# Patient Record
Sex: MALE | Race: BLACK OR AFRICAN AMERICAN | NOT HISPANIC OR LATINO | Employment: OTHER | URBAN - METROPOLITAN AREA
[De-identification: names, ages, dates, MRNs, and addresses within clinical notes are randomized per-mention and may not be internally consistent; named-entity substitution may affect disease eponyms.]

---

## 2019-06-14 PROBLEM — K70.30 ALCOHOLIC CIRRHOSIS: Status: ACTIVE | Noted: 2018-04-03

## 2019-07-12 PROBLEM — K74.60 HEPATIC CIRRHOSIS: Status: ACTIVE | Noted: 2018-04-03

## 2019-07-12 PROBLEM — R74.8 ELEVATED LIVER ENZYMES: Status: ACTIVE | Noted: 2019-07-12

## 2019-07-12 PROBLEM — K62.1 RECTAL POLYP: Status: ACTIVE | Noted: 2019-04-30

## 2019-07-12 PROBLEM — R06.02 SHORTNESS OF BREATH: Status: ACTIVE | Noted: 2018-08-17

## 2019-07-12 PROBLEM — K76.82 ENCEPHALOPATHY, HEPATIC: Status: ACTIVE | Noted: 2019-07-12

## 2019-07-12 PROBLEM — I10 ESSENTIAL HYPERTENSION: Status: ACTIVE | Noted: 2019-07-12

## 2019-07-12 PROBLEM — R60.0 LEG EDEMA: Status: ACTIVE | Noted: 2019-07-12

## 2019-07-12 PROBLEM — C20 RECTAL CANCER: Status: ACTIVE | Noted: 2019-05-14

## 2019-07-23 PROBLEM — H40.003 GLAUCOMA SUSPECT OF BOTH EYES: Status: ACTIVE | Noted: 2019-07-23

## 2019-07-23 PROBLEM — H35.363 DRUSEN (DEGENERATIVE) OF MACULA, BILATERAL: Status: ACTIVE | Noted: 2019-07-23

## 2019-07-23 PROBLEM — H25.813 COMBINED FORM OF AGE-RELATED CATARACT, BOTH EYES: Status: ACTIVE | Noted: 2019-07-23

## 2019-08-02 PROBLEM — Z53.21 PATIENT LEFT WITHOUT BEING SEEN: Status: ACTIVE | Noted: 2019-08-02

## 2019-08-14 PROBLEM — H25.811 COMBINED FORMS OF AGE-RELATED CATARACT OF RIGHT EYE: Status: ACTIVE | Noted: 2019-08-14

## 2019-08-14 PROBLEM — Z98.42 STATUS POST LEFT CATARACT EXTRACTION: Status: ACTIVE | Noted: 2019-08-14

## 2021-05-06 PROBLEM — D64.9 ANEMIA: Status: ACTIVE | Noted: 2021-05-06

## 2021-05-06 PROBLEM — D68.9 COAGULOPATHY: Status: ACTIVE | Noted: 2021-05-06

## 2021-05-06 PROBLEM — E88.09 HYPOALBUMINEMIA: Status: ACTIVE | Noted: 2021-05-06

## 2021-05-06 PROBLEM — D69.6 THROMBOCYTOPENIA: Status: ACTIVE | Noted: 2021-05-06

## 2021-05-06 PROBLEM — N18.9 CHRONIC RENAL FAILURE: Status: ACTIVE | Noted: 2021-05-06

## 2021-05-06 PROBLEM — E87.20 METABOLIC ACIDEMIA: Status: ACTIVE | Noted: 2021-05-06

## 2022-01-12 PROBLEM — E87.20 METABOLIC ACIDEMIA: Status: RESOLVED | Noted: 2021-05-06 | Resolved: 2022-01-12

## 2022-01-12 PROBLEM — E87.5 HYPERKALEMIA: Status: ACTIVE | Noted: 2022-01-12

## 2022-01-12 PROBLEM — N18.32 STAGE 3B CHRONIC KIDNEY DISEASE: Status: ACTIVE | Noted: 2021-05-06

## 2022-01-26 PROBLEM — E87.1 HYPONATREMIA: Status: ACTIVE | Noted: 2022-01-26

## 2022-01-26 PROBLEM — K74.60 HEPATIC CIRRHOSIS: Status: RESOLVED | Noted: 2018-04-03 | Resolved: 2022-01-26

## 2022-01-28 ENCOUNTER — SPECIALTY PHARMACY (OUTPATIENT)
Dept: PHARMACY | Facility: CLINIC | Age: 72
End: 2022-01-28
Payer: MEDICARE

## 2022-01-28 NOTE — TELEPHONE ENCOUNTER
New RX Epclusa received. Test claim shows PA REQUIRED. Submitted via [facility] on 2/1/22. (Key: [id number]) Pending determination.

## 2022-02-02 NOTE — TELEPHONE ENCOUNTER
EPCLUSA PA APPROVED. [id number]. SOFOS/VELDONALD -100 is approved through 04/26/2022. $0 COPAY. FORWARDING TO INITIAL CONSULT/SHIPMENT.

## 2022-02-10 ENCOUNTER — SPECIALTY PHARMACY (OUTPATIENT)
Dept: PHARMACY | Facility: CLINIC | Age: 72
End: 2022-02-10
Payer: MEDICARE

## 2022-02-10 RX ORDER — ACETAMINOPHEN 500 MG
500 TABLET ORAL EVERY 6 HOURS PRN
COMMUNITY
End: 2022-05-04

## 2022-02-10 NOTE — TELEPHONE ENCOUNTER
Specialty Pharmacy - Initial Clinical Assessment  EPCLUSA REFILL 1 OF 3  Specialty Medication Orders Linked to Encounter    Flowsheet Row Most Recent Value   Medication #1 sofosbuvir-velpatasvir 400-100 mg Tab (Order#[id number], Rx#[id number])        Pierre Segura is a 71 y.o. male, who is followed by the specialty pharmacy service for management and education. Today he received education before his first fill with [facility].    Current Outpatient Medications   Medication Sig    acetaminophen (TYLENOL) 500 MG tablet Take 500 mg by mouth every 6 (six) hours as needed.    amLODIPine (NORVASC) 10 MG tablet Take 1 tablet (10 mg total) by mouth once daily.    folic acid (FOLVITE) 1 MG tablet TK 1 T PO QD    furosemide (LASIX) 40 MG tablet Take 1 tablet (40 mg total) by mouth once daily.    lactulose (CONSTULOSE) 10 gram/15 mL solution Take 30 mLs (20 g total) by mouth 3 (three) times daily.    latanoprost (XALATAN) 0.005 % ophthalmic solution Place 1 drop into both eyes every evening.    magnesium oxide (MAG-OX) 400 mg (241.3 mg magnesium) tablet TK 1 T PO QD    pantoprazole (PROTONIX) 40 MG tablet Take 1 tablet (40 mg total) by mouth once daily.    propranoloL (INDERAL) 60 MG tablet Take 1 tablet (60 mg total) by mouth 2 (two) times daily.    rifAXIMin (XIFAXAN) 550 mg Tab Take 1 tablet (550 mg total) by mouth 2 (two) times daily.    sofosbuvir-velpatasvir 400-100 mg Tab Take 1 tablet by mouth once daily.    thiamine (VITAMIN B-1) 100 MG tablet Take 1 tablet (100 mg total) by mouth once daily.   Last reviewed on 2/10/2022  8:23 AM by Catalina Snow, PharmD    Review of patient's allergies indicates:   Allergen Reactions    Statins-hmg-coa reductase inhibitors      Severe myositis   Last reviewed on  2/7/2022 1:04 PM by Oxana Amaya    Drug Interactions    Drug interactions evaluated: yes  Clinically relevant drug interactions identified: yes   Interactions list: pantoprazole    Drug management plan: Pantoprazole- plan to HOLD while on Epclusa x 12 weeks   Provided the patient with educational material regarding drug interactions: yes   Educational material comments: Pantoprazole- Counsled patient we will trial a hold of pantroprazole for 12 weeks while on Epclusa. If heartburn/acid reflux arises, patient may take Pepto-Bismol or antacids spaced 4 hours apart from Epclusa. Notified PCP via cc chart message. Pt repeated back drug management plan to verbalize understanding.          Adverse Effects    *All other systems reviewed and are negative       Assessment Questions - Documented Responses    Flowsheet Row Most Recent Value   Assessment    Medication Reconciliation completed for patient Yes   During the past 4 weeks, has patient missed any activities due to condition or medication? No   During the past 4 weeks, did patient have any of the following urgent care visits? None   Goals of Therapy Status Discussed (new start)   Welcome packet contents reviewed and discussed with patient? Yes   Assesment completed? Yes   Plan Therapy being initiated   Do you need to open a clinical intervention (i-vent)? No   Do you want to schedule first shipment? Yes   Medication #1 Assessment Info    Patient status New medication, New to [facility]   Is this medication appropriate for the patient? Yes   Is this medication effective? Not yet started        Refill Questions - Documented Responses    Flowsheet Row Most Recent Value   Patient Availability and HIPAA Verification    Does patient want to proceed with activity? Yes   HIPAA/medical authority confirmed? Yes   Relationship to patient of person spoken to? Self   Refill Screening Questions    When does the patient need to receive the medication? 02/11/22   Refill Delivery Questions    How will the patient receive the medication? Mail   When does the patient need to receive the medication? 02/11/22   Shipping Address Home   Address in [facility]  "confirmed and updated if neccessary? Yes   Expected Copay ($) 0   Is the patient able to afford the medication copay? Yes   Payment Method zero copay   Days supply of Refill 28   Supplies needed? No supplies needed   Refill activity completed? Yes   Refill activity plan Refill scheduled   Shipment/Pickup Date: 02/10/22          Objective    He has a past medical history of Alcoholic cirrhosis, Cataract, GERD (gastroesophageal reflux disease), Glaucoma suspect of both eyes, Hepatitis C virus, Hypertension, and Rectal tumor.    Tried/failed medications: none    BP Readings from Last 4 Encounters:   02/02/22 (!) 168/75   01/26/22 (!) 141/92   01/14/22 128/89   01/12/22 124/68     Ht Readings from Last 4 Encounters:   02/02/22 5' 7" (1.702 m)   01/26/22 5' 7" (1.702 m)   01/14/22 5' 7" (1.702 m)   01/12/22 5' 7" (1.702 m)     Wt Readings from Last 4 Encounters:   02/02/22 93 kg (205 lb)   01/26/22 91.6 kg (202 lb)   01/14/22 91.6 kg (202 lb)   01/12/22 97.1 kg (214 lb)     Recent Labs   Lab Result Units 01/26/22  0823 01/14/22  1317 01/12/22  0714 01/07/22  1501   Creatinine mg/dL 1.60 H 2.10 H 2.00 H 1.50 H   ALT U/L 29 25 26 26   AST U/L 57 H 34 47 H 41 H   Genotype: 2B (careeverywhere 2/2019)  HCV RNA: 13,889 IU/mL (2/6/2019)  Fibrosis: Fibroscan:  Compensated Cirrhosis  CP: B  Renal: >43.3 mL/min/1.73 m^2  Treatment:  naive  HBV: HBsAg (-, 12/2018), HBcAb (+ 2/2019), HBsAb (+ 2/2019), immune d/t vaccination  HIV: negative (4/28/21)  Appropriate based on AASLD guidelines: Treatment-Naive Genotype 2 Patients With Compensated Cirrhosis    The goals of Hepatitis C Virus (HCV) infection treatment include:  · Reducing all-cause mortality and liver-related health adverse consequences, including cirrhosis, end-stage liver disease, and hepatocellular carcinoma  · Achieving virologic cure as evidenced by a sustained virologic response  · Improving or maintaining quality of life  · Maintaining optimal therapy " adherence  · Minimizing and managing side effects  · Preventing the transmission of HCV    Goals of Therapy Status: Discussed (new start)    Assessment/Plan  Patient plans to start therapy on 02/11/22    Patient Counseling    Counseled the patient on the following: doses and administration discussed, safe handling, storage, and disposal discussed, possible adverse effects and management discussed, possible drug and prescription drug interactions discussed, possible drug and OTC drug and food interactions discussed, lab monitoring and follow-up discussed, therapeutic rationale discussed, cost of medications and cost implications discussed, adherence and missed doses discussed, pharmacy contact information discussed       Tasks added this encounter   3/4/2022 - Refill Call (Auto Added)  2/18/2022 - 7 Day Post Start Touchbase   Tasks due within next 3 months   No tasks due.     Catalina Snow, PharmD  [facility]  [facility] [facility] [address]  Phone: [phone]  Fax: [phone]

## 2022-02-17 PROBLEM — K76.82 ENCEPHALOPATHY, HEPATIC: Status: RESOLVED | Noted: 2019-07-12 | Resolved: 2022-02-17

## 2022-02-18 ENCOUNTER — SPECIALTY PHARMACY (OUTPATIENT)
Dept: PHARMACY | Facility: CLINIC | Age: 72
End: 2022-02-18
Payer: MEDICARE

## 2022-02-18 NOTE — TELEPHONE ENCOUNTER
7 Day Touchbase - Documented Responses    Flowsheet Row Most Recent Value   Have you started taking your medication? Yes   What day did you start? 02/12/22   How are you feeling?  pt feels fine,  denies side effect or missed doses.   How are you taking your medication? Are you having any problems taking your medication? daily at 6am,  no issues,  uses a pillbox for adherence   Do you have any questions or concerns that we can help you with today? no questions or concerns. advised [facility] will f/u in 2 weeks for next refill. provided [facility]'s ph#          Catalina Snow, PharmD  [facility]  [address] [place] [address]  Phone: [phone]  Fax: [phone]

## 2022-03-04 ENCOUNTER — SPECIALTY PHARMACY (OUTPATIENT)
Dept: PHARMACY | Facility: CLINIC | Age: 72
End: 2022-03-04
Payer: MEDICARE

## 2022-03-04 DIAGNOSIS — B18.2 CHRONIC HEPATITIS C WITH HEPATIC COMA: Primary | ICD-10-CM

## 2022-03-04 NOTE — TELEPHONE ENCOUNTER
Specialty Pharmacy - Refill Coordination  EPCLUSA REFILL 2 OF 3  Specialty Medication Orders Linked to Encounter    Flowsheet Row Most Recent Value   Medication #1 sofosbuvir-velpatasvir 400-100 mg Tab (Order#[id number], Rx#[id number])        Epclusa refill (2 of 3) confirmed and reassessment complete. Confirmed 2 patient identifiers - name and . Therapy appropriate.     Patient has a week's doses of Epclusa remaining and takes it around 6am daily. Pt reports they are not having any side effects so far. No missed doses, no new medications, no new allergies or health conditions reported at this time. Allergies reviewed and medication reconciliation complete (reviewed and documented in [facility]). Disease education reviewed (including transmission and prevention). Patient counseled on importance of maintaining adherence and keeping lab appointments which were scheduled. All questions answered and addressed to patients satisfaction. Advised to call [facility] and provider if any issues arise.  Pt verbalized understanding.     Refill Questions - Documented Responses    Flowsheet Row Most Recent Value   Patient Availability and HIPAA Verification    Does patient want to proceed with activity? Yes   HIPAA/medical authority confirmed? Yes   Relationship to patient of person spoken to? Self   Refill Screening Questions    Changes to allergies? No   Changes to medications? No   New conditions since last clinic visit? No   Unplanned office visit, urgent care, ED, or hospital admission in the last 4 weeks? No   How does patient/caregiver feel medication is working? Excellent   Financial problems or insurance changes? No   How many doses of your specialty medications were missed in the last 4 weeks? 0   Would patient like to speak to a pharmacist? No   When does the patient need to receive the medication? 22   Refill Delivery Questions    How will the patient receive the medication? Mail   When  does the patient need to receive the medication? 03/11/22   Shipping Address Home   Address in [facility] confirmed and updated if neccessary? Yes   Expected Copay ($) 0   Is the patient able to afford the medication copay? Yes   Payment Method zero copay   Days supply of Refill 28   Supplies needed? No supplies needed   Refill activity completed? Yes   Refill activity plan Refill scheduled   Shipment/Pickup Date: 03/07/22          Current Outpatient Medications   Medication Sig    acetaminophen (TYLENOL) 500 MG tablet Take 500 mg by mouth every 6 (six) hours as needed.    amLODIPine (NORVASC) 10 MG tablet Take 1 tablet (10 mg total) by mouth once daily.    folic acid (FOLVITE) 1 MG tablet TK 1 T PO QD    furosemide (LASIX) 40 MG tablet Take 1 tablet (40 mg total) by mouth once daily.    lactulose (CONSTULOSE) 10 gram/15 mL solution Take 30 mLs (20 g total) by mouth 3 (three) times daily.    latanoprost (XALATAN) 0.005 % ophthalmic solution Place 1 drop into both eyes every evening.    magnesium oxide (MAG-OX) 400 mg (241.3 mg magnesium) tablet TK 1 T PO QD    neomycin-polymyxin-dexamethasone (MAXITROL) 3.5 mg/g-10,000 unit/g-0.1 % Oint Place into the left eye 2 (two) times a day.    propranoloL (INDERAL) 60 MG tablet Take 1 tablet (60 mg total) by mouth 2 (two) times daily.    rifAXIMin (XIFAXAN) 550 mg Tab Take 1 tablet (550 mg total) by mouth 2 (two) times daily.    sofosbuvir-velpatasvir 400-100 mg Tab Take 1 tablet by mouth once daily.    tamsulosin (FLOMAX) 0.4 mg Cap Take 1 capsule (0.4 mg total) by mouth once daily.    thiamine (VITAMIN B-1) 100 MG tablet Take 1 tablet (100 mg total) by mouth once daily.   Last reviewed on 2/23/2022 12:41 PM by Abena Velasquez MA    Review of patient's allergies indicates:   Allergen Reactions    Statins-hmg-coa reductase inhibitors      Severe myositis    Last reviewed on  2/23/2022 12:41 PM by Rupert Velasquez      Tasks added this encounter    4/1/2022 - Refill Call (Auto Added)   Tasks due within next 3 months   No tasks due.     Catalina Snow, PharmD  [facility]  [address]  [facility] [address]  Phone: [phone]  Fax: [phone]

## 2022-03-09 ENCOUNTER — SPECIALTY PHARMACY (OUTPATIENT)
Dept: PHARMACY | Facility: CLINIC | Age: 72
End: 2022-03-09
Payer: MEDICARE

## 2022-03-11 NOTE — TELEPHONE ENCOUNTER
Incoming call from patient reporting that he received his refill and now wanted to know if it was safe to take Xifaxan. We reviewed there are no DDIs with Xifaxan, and he is safe to take this. Patient confirms he is HOLDING pantoprazole and also stopped spironolactone. Patient denies any further questions/concerns.

## 2022-03-17 PROBLEM — E87.1 HYPONATREMIA: Status: RESOLVED | Noted: 2022-01-26 | Resolved: 2022-03-17

## 2022-03-21 NOTE — TELEPHONE ENCOUNTER
Incoming call from patient stating he plans to start taking spironolactone but was not sure if there were DDIs with Epclusa. Confirmed with [facility] no DDIs. Counseled patient that spironolactone is safe to take, and he had no further questions or concerns.

## 2022-04-01 ENCOUNTER — SPECIALTY PHARMACY (OUTPATIENT)
Dept: PHARMACY | Facility: CLINIC | Age: 72
End: 2022-04-01
Payer: MEDICARE

## 2022-04-01 DIAGNOSIS — B18.2 CHRONIC HEPATITIS C WITH HEPATIC COMA: Primary | ICD-10-CM

## 2022-04-01 NOTE — TELEPHONE ENCOUNTER
Specialty Pharmacy - Refill Coordination  EPCLUSA REFILL 3 OF 3   Specialty Medication Orders Linked to Encounter    Flowsheet Row Most Recent Value   Medication #1 sofosbuvir-velpatasvir 400-100 mg Tab (Order#[id number], Rx#[id number])          Refill Questions - Documented Responses    Flowsheet Row Most Recent Value   Patient Availability and HIPAA Verification    Does patient want to proceed with activity? Yes   HIPAA/medical authority confirmed? Yes   Relationship to patient of person spoken to? Self   Refill Screening Questions    Changes to allergies? No   Changes to medications? No   New conditions since last clinic visit? No   Unplanned office visit, urgent care, ED, or hospital admission in the last 4 weeks? No   How does patient/caregiver feel medication is working? Very good   Financial problems or insurance changes? No   How many doses of your specialty medications were missed in the last 4 weeks? 0   Would patient like to speak to a pharmacist? No   When does the patient need to receive the medication? 04/09/22   Refill Delivery Questions    How will the patient receive the medication? Mail   When does the patient need to receive the medication? 04/09/22   Shipping Address Home   Address in [facility] confirmed and updated if neccessary? Yes   Expected Copay ($) 0   Is the patient able to afford the medication copay? Yes   Payment Method zero copay   Days supply of Refill 28   Supplies needed? No supplies needed   Refill activity completed? Yes   Refill activity plan Refill scheduled   Shipment/Pickup Date: 04/04/22          Current Outpatient Medications   Medication Sig    acetaminophen (TYLENOL) 500 MG tablet Take 500 mg by mouth every 6 (six) hours as needed.    amLODIPine (NORVASC) 10 MG tablet Take 1 tablet (10 mg total) by mouth once daily.    bumetanide (BUMEX) 2 MG tablet Take 1 tablet (2 mg total) by mouth 2 (two) times daily.    chlorthalidone (HYGROTEN) 25 MG Tab Take 1 tablet  (25 mg total) by mouth once daily.    folic acid (FOLVITE) 1 MG tablet TK 1 T PO QD    lactulose (CONSTULOSE) 10 gram/15 mL solution Take 30 mLs (20 g total) by mouth 3 (three) times daily.    latanoprost (XALATAN) 0.005 % ophthalmic solution Place 1 drop into both eyes every evening.    magnesium oxide (MAG-OX) 400 mg (241.3 mg magnesium) tablet TK 1 T PO QD    neomycin-polymyxin-dexamethasone (MAXITROL) 3.5 mg/g-10,000 unit/g-0.1 % Oint Place into the left eye 2 (two) times a day. (Patient not taking: Reported on 3/17/2022)    propranoloL (INDERAL) 60 MG tablet Take 1 tablet (60 mg total) by mouth 2 (two) times daily.    rifAXIMin (XIFAXAN) 550 mg Tab Take 1 tablet (550 mg total) by mouth 2 (two) times daily.    sofosbuvir-velpatasvir 400-100 mg Tab Take 1 tablet by mouth once daily.    spironolactone (ALDACTONE) 50 MG tablet Take 1 tablet (50 mg total) by mouth once daily.    tamsulosin (FLOMAX) 0.4 mg Cap Take 1 capsule (0.4 mg total) by mouth once daily.    thiamine (VITAMIN B-1) 100 MG tablet Take 1 tablet (100 mg total) by mouth once daily.   Last reviewed on 3/17/2022  2:15 PM by Marcia Mulligan, RN    Review of patient's allergies indicates:   Allergen Reactions    Statins-hmg-coa reductase inhibitors      Severe myositis    Last reviewed on  3/17/2022 2:11 PM by Marcia Mulligan      Tasks added this encounter   No tasks added.   Tasks due within next 3 months   No tasks due.     Catalina Snow, PharmD  [facility]  [address] [place] [address]  Phone: [phone]  Fax: [phone]

## 2022-04-07 ENCOUNTER — TELEPHONE (OUTPATIENT)
Dept: HEPATOLOGY | Facility: CLINIC | Age: 72
End: 2022-04-07
Payer: MEDICARE

## 2022-04-07 NOTE — TELEPHONE ENCOUNTER
Patient: Kamran Segura       MRN: [id number]      : 1950     Age: 71 y.o.  [address]  [address]  [place] [address]    Providers  Hepatologists: Jonathan Jeff MD      Priority of review: Benign disease    Patient Transplant Status: In Evaluation    Reason for presentation: Indeterminate lesion    Clinical Summary: Kamran Segura is a 71 y.o. male with PMH of Rectal adenocarcinoma (diagnosed on colonoscopy 2019 showing 2 cm polyp in Rectosigmoid colon removed completely by EMR  ), Hep C, cirrhosis refereed for transplant evalaution for MELD 20    Pt is  following with Heme Onc and Colorectal surgery for ? Rectal CA but mentions that he was told to have no malignancy  Per recordsolonoscopy done in 2019 showed tattoo area at rectosigmoid area, bx done showed unremarkable colon mucosa. PET Scan performed No PET CT evidence of malignant disease    Imaging to be reviewed: CT    HCC Treatment History:  none    ABO:     Platelets:   Lab Results   Component Value Date/Time    PLT 79 (L) 2022 08:23 AM     Creatinine:   Lab Results   Component Value Date/Time    CREATININE 1.40 2022 02:14 PM    CREATININE 1.40 2022 02:14 PM    CREATININE 1.48 (H) 2019 01:46 PM     Bilirubin:   Lab Results   Component Value Date/Time    BILITOT 4.2 (H) 2022 02:14 PM    BILITOT 4.2 (H) 2022 02:14 PM     AFP Last 3 each if available: No results found for: AFP, EXTAFP    MELD: MELD-Na score: 23 at 2022  2:14 PM  MELD score: 20 at 2022  2:14 PM  Calculated from:  Serum Creatinine: 1.40 mg/dL at 2022  2:14 PM  Serum Sodium: 132 mmol/L at 2022  2:14 PM  Total Bilirubin: 4.2 mg/dL at 2022  2:14 PM  INR(ratio): 1.62 at 2022  2:14 PM  Age: 71 years    Plan:     Follow-up Provider:

## 2022-04-12 ENCOUNTER — CONFERENCE (OUTPATIENT)
Dept: TRANSPLANT | Facility: CLINIC | Age: 72
End: 2022-04-12
Payer: MEDICARE

## 2022-04-12 ENCOUNTER — TELEPHONE (OUTPATIENT)
Dept: HEPATOLOGY | Facility: CLINIC | Age: 72
End: 2022-04-12
Payer: MEDICARE

## 2022-04-12 DIAGNOSIS — K70.30 ALCOHOLIC CIRRHOSIS OF LIVER WITHOUT ASCITES: Primary | ICD-10-CM

## 2022-04-12 NOTE — TELEPHONE ENCOUNTER
Patient: Kamran Segura       MRN: [id number]      : 1950     Age: 71 y.o.  [address]  [address]  [facility] [facility] [address]    Providers  Hepatologists: Jonathan Jeff MD      Priority of review: Benign disease    Patient Transplant Status: In Evaluation    Reason for presentation: Indeterminate lesion    Clinical Summary: Kamran Segura is a 71 y.o. male with PMH of Rectal adenocarcinoma (diagnosed on colonoscopy 2019 showing 2 cm polyp in Rectosigmoid colon removed completely by EMR  ), Hep C, cirrhosis refereed for transplant evalaution for MELD 20    Pt is  following with Heme Onc and Colorectal surgery for ? Rectal CA but mentions that he was told to have no malignancy  Per recordsolonoscopy done in 2019 showed tattoo area at rectosigmoid area, bx done showed unremarkable colon mucosa. PET Scan performed No PET CT evidence of malignant disease    Imaging to be reviewed: CT    HCC Treatment History:  none    ABO:     Platelets:   Lab Results   Component Value Date/Time    PLT 79 (L) 2022 08:23 AM     Creatinine:   Lab Results   Component Value Date/Time    CREATININE 1.40 2022 02:14 PM    CREATININE 1.40 2022 02:14 PM    CREATININE 1.48 (H) 2019 01:46 PM     Bilirubin:   Lab Results   Component Value Date/Time    BILITOT 4.2 (H) 2022 02:14 PM    BILITOT 4.2 (H) 2022 02:14 PM     AFP Last 3 each if available: No results found for: AFP, EXTAFP    MELD: MELD-Na score: 23 at 2022  2:14 PM  MELD score: 20 at 2022  2:14 PM  Calculated from:  Serum Creatinine: 1.40 mg/dL at 2022  2:14 PM  Serum Sodium: 132 mmol/L at 2022  2:14 PM  Total Bilirubin: 4.2 mg/dL at 2022  2:14 PM  INR(ratio): 1.62 at 2022  2:14 PM  Age: 71 years    Plan/Discussion: 1.9 cm non enhancing focus in the inferior right lobe (se12,im35). not definitively seen on US or PET from 2019. Triple phase CT  with and without contrast to better assess.     Committee  Discussion:  Patient has a lesion of 1.9 cm. It is not enhancing. It was not seeing in U/S. It does not look like HCC. Needs imaging to better assess it.    Plan:  TP ct scan.    Follow-up Provider: Dr Jeff

## 2022-04-12 NOTE — TELEPHONE ENCOUNTER
Called and informed Multidisciplinary team meeting's recommendations and plan. All questions answered. Patient expressed understanding.      Plan:  CT liver at [facility] in 3 months after finishing HCV treatment

## 2022-04-28 PROBLEM — R76.8 POSITIVE ANA (ANTINUCLEAR ANTIBODY): Status: ACTIVE | Noted: 2022-04-28

## 2022-05-04 PROBLEM — I95.2 HYPOTENSION DUE TO DRUGS: Status: ACTIVE | Noted: 2022-05-04

## 2022-05-04 PROBLEM — H57.89 EYE REDNESS: Status: ACTIVE | Noted: 2022-05-04

## 2022-05-04 PROBLEM — R00.1 BRADYCARDIA: Status: ACTIVE | Noted: 2022-05-04

## 2022-05-04 PROBLEM — D61.818 PANCYTOPENIA: Status: ACTIVE | Noted: 2021-05-06

## 2022-05-04 PROBLEM — D31.02: Status: ACTIVE | Noted: 2022-05-04

## 2022-07-29 ENCOUNTER — TELEPHONE (OUTPATIENT)
Dept: GASTROENTEROLOGY | Facility: CLINIC | Age: 72
End: 2022-07-29
Payer: MEDICARE

## 2022-07-29 NOTE — TELEPHONE ENCOUNTER
Call patients mob lie no answer  ----- Message from Jonathan Jeff MD sent at 7/26/2022 11:18 AM CDT -----  Results reviewed. Plan to discuss in IR

## 2022-08-02 ENCOUNTER — TELEPHONE (OUTPATIENT)
Dept: GASTROENTEROLOGY | Facility: CLINIC | Age: 72
End: 2022-08-02
Payer: MEDICARE

## 2022-08-02 NOTE — TELEPHONE ENCOUNTER
Patient: Kamran Segura       MRN: [id number]      : 1950     Age: 72 y.o.  [address]  [address]  [place] [address]    Providers  Hepatologists: Jonathan Jeff MD      Priority of review: Transplant related    Patient Transplant Status: In Evaluation    Reason for presentation: Indeterminate lesion    Clinical Summary: 71 y.o. male with PMH of Rectal adenocarcinoma (diagnosed on colonoscopy 2019 showing 2 cm polyp in Rectosigmoid colon ), Hep C  Liver Cirrhosis referred for evaluation-- liver transplant    Imaging to be reviewed: CT    HCC Treatment History: none    Platelets:   Lab Results   Component Value Date/Time    PLT 81 (L) 2022 02:12 PM     Creatinine:   Lab Results   Component Value Date/Time    CREATININE 2.20 (H) 2022 02:12 PM    CREATININE 1.48 (H) 2019 01:46 PM     Bilirubin:   Lab Results   Component Value Date/Time    BILITOT 2.7 (H) 2022 02:12 PM     AFP Last 3 each if available: No results found for: AFP, EXTAFP    MELD: MELD-Na score: 21 at 2022  5:08 AM  MELD score: 18 at 2022  5:08 AM  Calculated from:  Serum Creatinine: 1.50 mg/dL at 2022  5:08 AM  Serum Sodium: 133 mmol/L at 2022  5:08 AM  Total Bilirubin: 3.1 mg/dL at 2022  5:08 AM  INR(ratio): 1.44 at 2022  9:05 AM  Age: 71 years    Plan:     Follow-up Provider:

## 2022-08-09 ENCOUNTER — CONFERENCE (OUTPATIENT)
Dept: TRANSPLANT | Facility: CLINIC | Age: 72
End: 2022-08-09
Payer: MEDICARE

## 2022-08-10 NOTE — TELEPHONE ENCOUNTER
Patient: Kamran Segura       MRN: [id number]      : 1950     Age: 72 y.o.  [address]  [address]  [place] [address]    Providers  Hepatologists: Jonathan Jeff MD      Priority of review: Transplant related    Patient Transplant Status: In Evaluation    Reason for presentation: Indeterminate lesion    Clinical Summary: 71 y.o. male with PMH of Rectal adenocarcinoma (diagnosed on colonoscopy 2019 showing 2 cm polyp in Rectosigmoid colon ), Hep C  Liver Cirrhosis referred for evaluation-- liver transplant    Imaging to be reviewed: CT    HCC Treatment History: none    Platelets:   Lab Results   Component Value Date/Time    PLT 81 (L) 2022 02:12 PM     Creatinine:   Lab Results   Component Value Date/Time    CREATININE 2.20 (H) 2022 02:12 PM    CREATININE 1.48 (H) 2019 01:46 PM     Bilirubin:   Lab Results   Component Value Date/Time    BILITOT 2.7 (H) 2022 02:12 PM     AFP Last 3 each if available: No results found for: AFP, EXTAFP    MELD: MELD-Na score: 21 at 2022  5:08 AM  MELD score: 18 at 2022  5:08 AM  Calculated from:  Serum Creatinine: 1.50 mg/dL at 2022  5:08 AM  Serum Sodium: 133 mmol/L at 2022  5:08 AM  Total Bilirubin: 3.1 mg/dL at 2022  5:08 AM  INR(ratio): 1.44 at 2022  9:05 AM  Age: 71 years    Plan: No definite HCC or tumor present.  Cirrhotic liver with heterogeneous attenuation.  Recommend MRI for surveillance and f/u.     Committee Discussion: Nothing that looks like a discrete lesion. Small spots/dots that could be related to heterogeneous attenuation of the liver. Can't really call an HCC. Nothing seen on MRI 3 months ago, either. No chest imaging done and PET CT is not available.     Plan: Will obtain chest CT and results of PET CT and bring back for rediscussion before completing evaluation.     Follow-up Provider: Jonathan Jeff MD

## 2022-09-29 PROBLEM — I47.10 SVT (SUPRAVENTRICULAR TACHYCARDIA): Status: ACTIVE | Noted: 2022-09-29

## 2022-09-29 PROBLEM — I44.1 ATRIOVENTRICULAR BLOCK, MOBITZ TYPE 1, WENCKEBACH: Status: ACTIVE | Noted: 2022-09-29

## 2022-09-29 PROBLEM — I51.7 LEFT ATRIAL ENLARGEMENT: Status: ACTIVE | Noted: 2022-09-29

## 2022-09-29 PROBLEM — G47.30 SLEEP DISORDER BREATHING: Status: ACTIVE | Noted: 2022-09-29

## 2022-10-04 PROBLEM — K76.7 HEPATORENAL SYNDROME: Status: ACTIVE | Noted: 2022-10-04

## 2022-10-06 PROBLEM — N53.19 ABNORMAL EJACULATION: Status: ACTIVE | Noted: 2022-10-06

## 2022-10-06 PROBLEM — N28.1 RENAL CYST: Status: ACTIVE | Noted: 2022-10-06

## 2022-10-10 PROBLEM — Z98.890 STATUS POST CATHETER ABLATION OF SLOW PATHWAY: Status: ACTIVE | Noted: 2022-10-10

## 2022-10-10 PROBLEM — Z86.79 STATUS POST CATHETER ABLATION OF SLOW PATHWAY: Status: ACTIVE | Noted: 2022-10-10

## 2023-01-04 PROBLEM — N17.9 AKI (ACUTE KIDNEY INJURY): Status: ACTIVE | Noted: 2023-01-04

## 2023-01-04 PROBLEM — N18.30 CKD (CHRONIC KIDNEY DISEASE) STAGE 3, GFR 30-59 ML/MIN: Status: ACTIVE | Noted: 2021-05-06

## 2023-01-04 PROBLEM — K74.60 CIRRHOSIS: Status: RESOLVED | Noted: 2018-04-03 | Resolved: 2023-01-04

## 2023-01-04 PROBLEM — K02.9 DENTAL CARIES, UNSPECIFIED: Status: ACTIVE | Noted: 2023-01-04

## 2023-01-09 ENCOUNTER — PATIENT OUTREACH (OUTPATIENT)
Dept: ADMINISTRATIVE | Facility: CLINIC | Age: 73
End: 2023-01-09
Payer: MEDICARE

## 2023-01-09 PROBLEM — K76.7 HEPATORENAL SYNDROME: Status: RESOLVED | Noted: 2022-10-04 | Resolved: 2023-01-09

## 2023-01-09 NOTE — PROGRESS NOTES
C3 nurse spoke with Kamran Segura  for a TCC post hospital discharge follow up call. The patient does not have a scheduled HOSFU appointment with Hawk Ramirez MD  within 5-7 days post hospital discharge date 1/7/2023. C3 nurse was unable to schedule HOSFU appointment in [facility].    Message sent to PCP staff requesting they contact patient and schedule follow up appointment.

## 2023-02-02 PROBLEM — E87.6 HYPOKALEMIA: Status: ACTIVE | Noted: 2023-02-02

## 2023-02-14 PROBLEM — K05.329 CHRONIC PERIODONTITIS, GENERALIZED: Status: ACTIVE | Noted: 2023-02-14

## 2023-02-14 PROBLEM — K08.3 RETAINED DENTAL ROOT: Status: ACTIVE | Noted: 2023-02-14

## 2023-03-07 PROBLEM — K08.3 RETAINED DENTAL ROOT: Status: RESOLVED | Noted: 2023-02-14 | Resolved: 2023-03-07

## 2023-03-07 PROBLEM — D31.02: Status: RESOLVED | Noted: 2022-05-04 | Resolved: 2023-03-07

## 2023-03-07 PROBLEM — K05.329 CHRONIC PERIODONTITIS, GENERALIZED: Status: RESOLVED | Noted: 2023-02-14 | Resolved: 2023-03-07

## 2023-03-07 PROBLEM — H57.89 EYE REDNESS: Status: RESOLVED | Noted: 2022-05-04 | Resolved: 2023-03-07

## 2023-04-10 PROBLEM — N17.9 AKI (ACUTE KIDNEY INJURY): Status: RESOLVED | Noted: 2023-01-04 | Resolved: 2023-04-10

## 2023-04-11 ENCOUNTER — TELEPHONE (OUTPATIENT)
Dept: TRANSPLANT | Facility: CLINIC | Age: 73
End: 2023-04-11
Payer: MEDICARE

## 2023-04-11 DIAGNOSIS — K74.60 HEPATIC CIRRHOSIS, UNSPECIFIED HEPATIC CIRRHOSIS TYPE, UNSPECIFIED WHETHER ASCITES PRESENT: Primary | ICD-10-CM

## 2023-04-11 NOTE — TELEPHONE ENCOUNTER
Patient: Kamran Segura       MRN: [id number]      : 1950     Age: 72 y.o.  [address]  [facility] [address]    Presenting Radiologists:     Providers  Hepatologists: Jonathan Jeff MD  Surgeons:   Radiologists:   Advanced Practice providers:     Priority of review: Other? Lesions for HCC    Patient Transplant Status: Other ? candidate  to be seen in clinic again  by DR Jeff.     Reason for presentation: Indeterminate lesion    Clinical Summary: PT was discussed in Committee on 2022.  Plan: No definite HCC or tumor present.  Cirrhotic liver with heterogeneous attenuation.  Recommend MRI for surveillance and f/u.    Committee Discussion: Nothing that looks like a discrete lesion. Small spots/dots that could be related to heterogeneous attenuation of the liver. Can't really call an HCC. Nothing seen on MRI 3 months ago, either. No chest imaging done and PET CT is not available.    Plan: Will obtain chest CT and results of PET CT and bring back for rediscussion before completing evaluation.  MRI and CT of Chest done    72y.o. 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 Removed by piecemeal.     Hep C treated and negative viral. Was in the process of txp eval at [facility]  but cancer diagnosis then per pt insurance issues.      Imaging to be reviewed:  MRI 3/3/223  CT chest   MRI 2022    HCC Treatment History: NA    ABO: NA    Platelets:   Lab Results   Component Value Date/Time    PLT 70 (L) 2023 09:27 AM     Creatinine:   Lab Results   Component Value Date/Time    CREATININE 1.5 (H) 2023 09:27 AM    CREATININE 1.48 (H) 2019 01:46 PM     Bilirubin:   Lab Results   Component Value Date/Time    BILITOT 4.9 (H) 2023 09:27 AM     AFP Last 3 each if  available: No results found for: AFP, EXTAFP    MELD: MELD-Na score: 28 at 1/7/2023  5:10 AM  MELD score: 25 at 1/7/2023  5:10 AM  Calculated from:  Serum Creatinine: 2.2 mg/dL at 1/7/2023  5:10 AM  Serum Sodium: 130 mmol/L at 1/7/2023  5:10 AM  Total Bilirubin: 5.3 mg/dL at 1/7/2023  5:10 AM  INR(ratio): 1.53 at 1/5/2023  1:54 AM  Age: 72 years    Plan:     Follow-up Provider: Randee

## 2023-04-11 NOTE — TELEPHONE ENCOUNTER
Called pt re follow up appt. With DR Jeff. Pt scheduled  4/25 with labs as well.  Pt agreed to appt. I did suggest he bring his daughter to the clinic appt. Informed pt the appt will be about the need for transplant or not and I highly recommend he brings his daughter.  Pt agreed.

## 2023-04-18 ENCOUNTER — CONFERENCE (OUTPATIENT)
Dept: TRANSPLANT | Facility: CLINIC | Age: 73
End: 2023-04-18

## 2023-04-18 ENCOUNTER — TELEPHONE (OUTPATIENT)
Dept: HEPATOLOGY | Facility: CLINIC | Age: 73
End: 2023-04-18
Payer: MEDICARE

## 2023-04-18 NOTE — TELEPHONE ENCOUNTER
I called the patient and left him a message to call me back at [phone]     Jonathan Jeff MD  Transplant Hepatology   Gastroenterology  [facility]  \

## 2023-04-20 PROBLEM — Z12.5 PROSTATE CANCER SCREENING: Status: ACTIVE | Noted: 2023-04-20

## 2023-04-24 NOTE — TELEPHONE ENCOUNTER
Patient: Kamran Segura       MRN: [id number]      : 1950     Age: 72 y.o.  [address]  [facility] [address]     Presenting Radiologists:      Providers  Hepatologists: Jonathan Jeff MD  Surgeons:   Radiologists:   Advanced Practice providers:      Priority of review: Other? Lesions for HCC     Patient Transplant Status: Other ? candidate  to be seen in clinic again  by DR Jeff.      Reason for presentation: Indeterminate lesion     Clinical Summary: PT was discussed in Committee on 2022.  Plan: No definite HCC or tumor present.  Cirrhotic liver with heterogeneous attenuation.  Recommend MRI for surveillance and f/u.    Committee Discussion: Nothing that looks like a discrete lesion. Small spots/dots that could be related to heterogeneous attenuation of the liver. Can't really call an HCC. Nothing seen on MRI 3 months ago, either. No chest imaging done and PET CT is not available.    Plan: Will obtain chest CT and results of PET CT and bring back for rediscussion before completing evaluation.  MRI and CT of Chest done     72y.o. 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 Removed by piecemeal.     Hep C treated and negative viral. Was in the process of txp eval at [facility]  but cancer diagnosis then per pt insurance issues.       Imaging to be reviewed:  MRI 3/3/223  CT chest   MRI 2022     HCC Treatment History: NA     ABO: NA     Platelets:         Lab Results   Component Value Date/Time     PLT 70 (L) 2023 09:27 AM      Creatinine:         Lab Results   Component Value Date/Time     CREATININE 1.5 (H) 2023 09:27 AM     CREATININE 1.48 (H) 2019 01:46 PM      Bilirubin:         Lab Results   Component Value Date/Time     BILITOT 4.9 (H) 2023  09:27 AM      AFP Last 3 each if available: No results found for: AFP, EXTAFP     MELD: MELD-Na score: 28 at 1/7/2023  5:10 AM  MELD score: 25 at 1/7/2023  5:10 AM  Calculated from:  Serum Creatinine: 2.2 mg/dL at 1/7/2023  5:10 AM  Serum Sodium: 130 mmol/L at 1/7/2023  5:10 AM  Total Bilirubin: 5.3 mg/dL at 1/7/2023  5:10 AM  INR(ratio): 1.53 at 1/5/2023  1:54 AM  Age: 72 years     Plan: Most recent MRI appears unchanged from the prior without evidence of new lesion. Exophytic nodule in S4 demonstrates liver signal intensity and likely represents portions of the liver. No lesions that meet criteria for HCC. Rec continued surveillance with MRI in 3 months.       Follow-up Provider: Randee

## 2023-04-27 DIAGNOSIS — Z76.82 ORGAN TRANSPLANT CANDIDATE: ICD-10-CM

## 2023-04-27 DIAGNOSIS — K74.60 HEPATIC CIRRHOSIS, UNSPECIFIED HEPATIC CIRRHOSIS TYPE, UNSPECIFIED WHETHER ASCITES PRESENT: Primary | ICD-10-CM

## 2023-05-26 PROBLEM — E04.1 THYROID NODULE: Status: ACTIVE | Noted: 2023-05-26

## 2023-06-20 PROBLEM — R06.02 SHORTNESS OF BREATH: Status: RESOLVED | Noted: 2018-08-17 | Resolved: 2023-06-20

## 2023-06-22 PROBLEM — I27.20 PULMONARY HYPERTENSION: Status: ACTIVE | Noted: 2023-06-22

## 2023-07-26 PROBLEM — E83.52 HYPERCALCEMIA: Status: ACTIVE | Noted: 2023-07-26

## 2023-08-23 PROBLEM — K08.109 EDENTULISM: Status: ACTIVE | Noted: 2023-08-23

## 2023-09-04 NOTE — TELEPHONE ENCOUNTER
Incoming call from patient stating he did not receive his Epclusa yesterday. [employer] left a door tag, and per [employer] tracking it is now at the local [employer] facility. Escalating to FF. Patient is aware we will follow up with updates. Patient reports he has medication through 3/11 and needs refill by 3/12.    Yes

## 2023-09-13 ENCOUNTER — TELEPHONE (OUTPATIENT)
Dept: TRANSPLANT | Facility: CLINIC | Age: 73
End: 2023-09-13
Payer: MEDICARE

## 2023-09-13 DIAGNOSIS — K76.9 LIVER DISEASE, UNSPECIFIED: Primary | ICD-10-CM

## 2023-09-13 NOTE — TELEPHONE ENCOUNTER
Call the patient to schedule a follow up MRI,  no answer message left.  Patient 's daughter called about the scheduling the  appointment.  Message sent to the scheduling department to have the patient scheduled for a follow up MRI.

## 2023-11-01 PROBLEM — E55.9 VITAMIN D INSUFFICIENCY: Status: ACTIVE | Noted: 2023-11-01

## 2023-12-06 PROBLEM — K74.00 LIVER FIBROSIS: Status: ACTIVE | Noted: 2018-04-03

## 2023-12-20 PROBLEM — N18.31 STAGE 3A CHRONIC KIDNEY DISEASE: Status: ACTIVE | Noted: 2021-05-06

## 2023-12-22 ENCOUNTER — TELEPHONE (OUTPATIENT)
Dept: ADMINISTRATIVE | Facility: HOSPITAL | Age: 73
End: 2023-12-22
Payer: MEDICARE

## 2023-12-22 VITALS — DIASTOLIC BLOOD PRESSURE: 45 MMHG | SYSTOLIC BLOOD PRESSURE: 137 MMHG

## 2024-02-21 PROBLEM — D49.0 IPMN (INTRADUCTAL PAPILLARY MUCINOUS NEOPLASM): Status: ACTIVE | Noted: 2024-02-21

## 2024-06-25 ENCOUNTER — TELEPHONE (OUTPATIENT)
Dept: ADMINISTRATIVE | Facility: HOSPITAL | Age: 74
End: 2024-06-25

## 2024-06-25 VITALS — DIASTOLIC BLOOD PRESSURE: 60 MMHG | SYSTOLIC BLOOD PRESSURE: 132 MMHG

## 2024-09-10 PROBLEM — R91.8 LUNG NODULE, MULTIPLE: Status: ACTIVE | Noted: 2024-09-10

## 2025-07-15 PROBLEM — H40.003 GLAUCOMA SUSPECT OF BOTH EYES: Status: RESOLVED | Noted: 2019-07-23 | Resolved: 2025-07-15

## 2025-07-15 PROBLEM — H40.1131 PRIMARY OPEN ANGLE GLAUCOMA OF BOTH EYES, MILD STAGE: Status: ACTIVE | Noted: 2025-07-15

## 2025-07-15 PROBLEM — H25.811 COMBINED FORMS OF AGE-RELATED CATARACT OF RIGHT EYE: Status: RESOLVED | Noted: 2019-08-14 | Resolved: 2025-07-15

## 2025-08-07 ENCOUNTER — PATIENT OUTREACH (OUTPATIENT)
Facility: OTHER | Age: 75
End: 2025-08-07
Payer: MEDICAID

## 2025-08-07 NOTE — PROGRESS NOTES
Patient was seen in the ED on 8/6/25. Phoned patient to assist with Post ED Discharge Navigation. Patient has question regarding the prescribed medication. Patient agreed to assistance scheduling a PCP follow-up appointment. In Basket message sent to PCP staff for scheduling assistance and to advise regarding the prescription.  Mariano Lombardi

## 2025-08-11 PROBLEM — T22.211D: Status: ACTIVE | Noted: 2025-08-11

## 2025-08-15 ENCOUNTER — TELEPHONE (OUTPATIENT)
Dept: TRANSPLANT | Facility: CLINIC | Age: 75
End: 2025-08-15
Payer: MEDICAID